# Patient Record
Sex: FEMALE | Race: OTHER | NOT HISPANIC OR LATINO
[De-identification: names, ages, dates, MRNs, and addresses within clinical notes are randomized per-mention and may not be internally consistent; named-entity substitution may affect disease eponyms.]

---

## 2022-04-28 ENCOUNTER — RESULT REVIEW (OUTPATIENT)
Age: 52
End: 2022-04-28

## 2023-02-16 PROBLEM — Z00.00 ENCOUNTER FOR PREVENTIVE HEALTH EXAMINATION: Status: ACTIVE | Noted: 2023-02-16

## 2023-03-27 ENCOUNTER — APPOINTMENT (OUTPATIENT)
Dept: CARDIOLOGY | Facility: CLINIC | Age: 53
End: 2023-03-27

## 2024-01-10 ENCOUNTER — APPOINTMENT (OUTPATIENT)
Dept: VASCULAR SURGERY | Facility: CLINIC | Age: 54
End: 2024-01-10

## 2024-04-03 ENCOUNTER — APPOINTMENT (OUTPATIENT)
Dept: VASCULAR SURGERY | Facility: CLINIC | Age: 54
End: 2024-04-03
Payer: COMMERCIAL

## 2024-04-03 ENCOUNTER — NON-APPOINTMENT (OUTPATIENT)
Age: 54
End: 2024-04-03

## 2024-04-03 VITALS — BODY MASS INDEX: 42.49 KG/M2 | HEIGHT: 65 IN | WEIGHT: 255 LBS

## 2024-04-03 DIAGNOSIS — I10 ESSENTIAL (PRIMARY) HYPERTENSION: ICD-10-CM

## 2024-04-03 DIAGNOSIS — Z78.9 OTHER SPECIFIED HEALTH STATUS: ICD-10-CM

## 2024-04-03 PROCEDURE — 99203 OFFICE O/P NEW LOW 30 MIN: CPT

## 2024-04-03 RX ORDER — AMLODIPINE BESYLATE 5 MG/1
5 TABLET ORAL
Refills: 0 | Status: ACTIVE | COMMUNITY

## 2024-04-03 NOTE — HISTORY OF PRESENT ILLNESS
[FreeTextEntry1] : 53 year old female  presents to the office with a chief complaint of bilateral lower extremity edema and leg pain associated with varicose veins. She reports that her pain and swelling worsens with prolonged standing. She reports that her edema improves with elevation. She  denies a history of DVT or SVT. She does not wear compression stockings. She reports a family history of varicose veins.

## 2024-04-03 NOTE — PHYSICAL EXAM
[JVD] : no jugular venous distention  [Normal Breath Sounds] : Normal breath sounds [Normal Rate and Rhythm] : normal rate and rhythm [2+] : left 2+ [Ankle Swelling Bilaterally] : bilaterally  [Ankle Swelling (On Exam)] : present [Ankle Swelling On The Right] : mild [Varicose Veins Of Lower Extremities] : bilaterally [Ankle Swelling On The Left] : moderate [] : bilaterally [Alert] : alert [Oriented to Person] : oriented to person [Oriented to Place] : oriented to place [Oriented to Time] : oriented to time [de-identified] : Awake and Alert [de-identified] : bilateral lower extremities with varicose veins > 3 mm [de-identified] : Appropriate

## 2024-04-03 NOTE — ASSESSMENT
[FreeTextEntry1] : 53  year old female with lower extremity pain and swelling associated with varicose veins. I am uncertain if she has venous insufficiency. I recommended that she undergo a lower extremity venous duplex. She will consider obtaining compression stockings. She will elevate her legs as much as possible.  She  will follow up with me when the results of the ultrasound are available. Further treatment will depend upon the results of the duplex and her response to conservative compression therapy.

## 2024-04-23 ENCOUNTER — APPOINTMENT (OUTPATIENT)
Dept: VASCULAR SURGERY | Facility: CLINIC | Age: 54
End: 2024-04-23
Payer: COMMERCIAL

## 2024-04-23 PROCEDURE — 93970 EXTREMITY STUDY: CPT

## 2024-06-12 ENCOUNTER — APPOINTMENT (OUTPATIENT)
Dept: VASCULAR SURGERY | Facility: CLINIC | Age: 54
End: 2024-06-12
Payer: COMMERCIAL

## 2024-06-12 VITALS
HEART RATE: 93 BPM | HEIGHT: 65 IN | WEIGHT: 262 LBS | DIASTOLIC BLOOD PRESSURE: 81 MMHG | SYSTOLIC BLOOD PRESSURE: 128 MMHG | BODY MASS INDEX: 43.65 KG/M2

## 2024-06-12 DIAGNOSIS — I87.2 VENOUS INSUFFICIENCY (CHRONIC) (PERIPHERAL): ICD-10-CM

## 2024-06-12 DIAGNOSIS — I83.813 VARICOSE VEINS OF BILATERAL LOWER EXTREMITIES WITH PAIN: ICD-10-CM

## 2024-06-12 PROCEDURE — 99214 OFFICE O/P EST MOD 30 MIN: CPT

## 2024-06-12 NOTE — END OF VISIT
[FreeTextEntry3] : All medical record entries made by the Aprilibe were at , ZACKARY ELLIOTT, direction and personally dictated by me on 6/12/2024. I have reviewed the chart and agree that the record accurately reflects my personal performance of the history, physical exam, assessment and plan. I have also personally directed, reviewed, and agreed with the chart.

## 2024-06-12 NOTE — PHYSICAL EXAM
[Normal Breath Sounds] : Normal breath sounds [Normal Rate and Rhythm] : normal rate and rhythm [Ankle Swelling (On Exam)] : present [Ankle Swelling Bilaterally] : bilaterally  [Ankle Swelling On The Right] : mild [Varicose Veins Of Lower Extremities] : bilaterally [Ankle Swelling On The Left] : moderate [Alert] : alert [Oriented to Person] : oriented to person [Oriented to Place] : oriented to place [Oriented to Time] : oriented to time [JVD] : no jugular venous distention  [] : not present [de-identified] : Awake and Alert [de-identified] : bilateral lower extremities with varicose veins > 3 mm [de-identified] : Appropriate

## 2024-06-12 NOTE — HISTORY OF PRESENT ILLNESS
[FreeTextEntry1] : 53 year old female  presents to the office with a chief complaint of bilateral lower extremity edema and leg pain associated with varicose veins. She reports that her pain and swelling worsens with prolonged standing. She reports that her edema improves with elevation. She  denies a history of DVT or SVT. She does not wear compression stockings. She reports a family history of varicose veins. [de-identified] : 54 y/o female returns in follow up.  She continues to have pain and swelling bilaterally despite wearing compression stockings.  Her symptoms are worse on the left  than the right.. She underwent venous testing and is here to discuss the results and additional treatment options.

## 2024-06-12 NOTE — ADDENDUM
[FreeTextEntry1] : Documented by Brett Crockett acting as a scribe for ZACKARY ELLIOTT on 6/12/2024.

## 2024-06-12 NOTE — ASSESSMENT
[FreeTextEntry1] :  54 yo female with symptomatic varicose veins and venous insufficiency. Patient continues to have pain and swelling despite wearing compression stockings.   She is an excellent candidate for an ablation of the greater saphenous veins. bilaterally. Risks and benefits were discussed with the patient, including bleeding, infection, and DVT. She had the opportunity to ask any questions, all of which were answered.  The patient would like to proceed with left leg GSV ablation followed by the right. Patient will continue to wear compression stockings until the procedure